# Patient Record
Sex: FEMALE | Race: WHITE | NOT HISPANIC OR LATINO | Employment: OTHER | URBAN - METROPOLITAN AREA
[De-identification: names, ages, dates, MRNs, and addresses within clinical notes are randomized per-mention and may not be internally consistent; named-entity substitution may affect disease eponyms.]

---

## 2018-04-13 ENCOUNTER — TRANSCRIBE ORDERS (OUTPATIENT)
Dept: ADMINISTRATIVE | Facility: HOSPITAL | Age: 58
End: 2018-04-13

## 2018-04-13 DIAGNOSIS — Z12.39 SCREENING BREAST EXAMINATION: Primary | ICD-10-CM

## 2018-04-14 ENCOUNTER — HOSPITAL ENCOUNTER (OUTPATIENT)
Dept: RADIOLOGY | Age: 58
Discharge: HOME | End: 2018-04-14
Payer: COMMERCIAL

## 2018-04-14 DIAGNOSIS — Z12.39 SCREENING BREAST EXAMINATION: ICD-10-CM

## 2018-04-14 PROCEDURE — 77067 SCR MAMMO BI INCL CAD: CPT

## 2018-04-14 PROCEDURE — 77063 BREAST TOMOSYNTHESIS BI: CPT

## 2018-12-18 ENCOUNTER — ANNUAL EXAM (OUTPATIENT)
Dept: OBGYN CLINIC | Facility: CLINIC | Age: 58
End: 2018-12-18
Payer: COMMERCIAL

## 2018-12-18 VITALS
HEIGHT: 63 IN | DIASTOLIC BLOOD PRESSURE: 80 MMHG | BODY MASS INDEX: 29.23 KG/M2 | SYSTOLIC BLOOD PRESSURE: 122 MMHG | WEIGHT: 165 LBS

## 2018-12-18 DIAGNOSIS — Z01.419 WOMEN'S ANNUAL ROUTINE GYNECOLOGICAL EXAMINATION: Primary | ICD-10-CM

## 2018-12-18 DIAGNOSIS — Z12.39 BREAST CANCER SCREENING: ICD-10-CM

## 2018-12-18 PROBLEM — M25.50 ARTHRALGIA OF MULTIPLE JOINTS: Status: ACTIVE | Noted: 2018-11-29

## 2018-12-18 PROBLEM — K21.9 GERD (GASTROESOPHAGEAL REFLUX DISEASE): Status: ACTIVE | Noted: 2018-12-18

## 2018-12-18 PROBLEM — N20.0 BILATERAL NEPHROLITHIASIS: Status: ACTIVE | Noted: 2018-11-29

## 2018-12-18 PROBLEM — R31.29 MICROSCOPIC HEMATURIA: Status: ACTIVE | Noted: 2018-11-29

## 2018-12-18 PROBLEM — E78.00 PURE HYPERCHOLESTEROLEMIA: Status: ACTIVE | Noted: 2018-12-18

## 2018-12-18 PROBLEM — F32.A DEPRESSION: Status: ACTIVE | Noted: 2018-12-18

## 2018-12-18 PROCEDURE — 99386 PREV VISIT NEW AGE 40-64: CPT | Performed by: NURSE PRACTITIONER

## 2018-12-18 RX ORDER — MULTIVITAMIN
1 TABLET ORAL DAILY
COMMUNITY
End: 2020-12-10 | Stop reason: ALTCHOICE

## 2018-12-18 RX ORDER — MONTELUKAST SODIUM 10 MG/1
10 TABLET ORAL
COMMUNITY
Start: 2018-09-11 | End: 2020-12-10 | Stop reason: ALTCHOICE

## 2018-12-18 RX ORDER — IBUPROFEN 200 MG
200 TABLET ORAL
COMMUNITY
Start: 2013-08-27

## 2018-12-18 RX ORDER — RANITIDINE 150 MG/1
1 TABLET ORAL
COMMUNITY
End: 2020-12-10 | Stop reason: ALTCHOICE

## 2018-12-18 RX ORDER — SODIUM PICOSULFATE, MAGNESIUM OXIDE, AND ANHYDROUS CITRIC ACID 10; 3.5; 12 MG/160ML; G/160ML; G/160ML
LIQUID ORAL
COMMUNITY
Start: 2018-12-10 | End: 2020-12-10 | Stop reason: ALTCHOICE

## 2018-12-18 RX ORDER — FEXOFENADINE HCL 180 MG/1
1 TABLET ORAL
COMMUNITY
Start: 2013-02-14

## 2018-12-18 RX ORDER — ALPRAZOLAM 0.25 MG/1
0.25 TABLET ORAL
COMMUNITY
Start: 2018-08-14

## 2018-12-18 RX ORDER — FLUTICASONE PROPIONATE 50 MCG
2 SPRAY, SUSPENSION (ML) NASAL DAILY
COMMUNITY
Start: 2011-05-08 | End: 2020-12-10 | Stop reason: ALTCHOICE

## 2018-12-18 NOTE — PROGRESS NOTES
Subjective  HPI:     Vinny Biggs is a 60 y o  female  She is a  2 Para 2, with 2 prior vaginal deliveries  She is menopausal  States her menopausal symptoms have improved and only has occasional hot flashes  She complains of dyspareunia and vaginal dryness  In the past she used premarin cream and did not like it's watery consistency  She had an episode of hematuria 6 weeks ago for about 3 days  CT and US are normal  She has renal scope scheduled on   Her depression/anxiety is stable  Her dental care is up-to-date  Gynecologic History    No LMP recorded  Patient is postmenopausal   Last Pap: 18  Results were: normal  Last mammogram: 18  Results were: normal  DXA Scan: 18: Osteopenia  Colonoscopy: scheduled for 18    Obstetric History    OB History    Para Term  AB Living   2         2   SAB TAB Ectopic Multiple Live Births           2      # Outcome Date GA Lbr Marques/2nd Weight Sex Delivery Anes PTL Lv   2             1                    The following portions of the patient's history were reviewed and updated as appropriate: allergies, current medications, past family history, past medical history, past social history, past surgical history and problem list     Review of Systems    Pertinent items are noted in HPI  Objective    Physical Exam   Constitutional: Vital signs are normal  She appears well-developed and well-nourished  Genitourinary: Vagina normal and uterus normal  Pelvic exam was performed with patient supine  There is no rash, tenderness, lesion or Bartholin's cyst on the right labia  There is no rash, tenderness, lesion or Bartholin's cyst on the left labia  Vagina exhibits no rugosity  Right adnexum does not display mass, does not display tenderness and does not display fullness  Left adnexum does not display mass, does not display tenderness and does not display fullness  Cervix is parous   Cervix does not exhibit motion tenderness, lesion, discharge, friability, polyp or nabothian cyst      Uterus is anteverted  HENT:   Head: Normocephalic and atraumatic  Neck: Neck supple  No thyromegaly present  Cardiovascular: Normal rate, regular rhythm, S1 normal, S2 normal and normal heart sounds  Pulmonary/Chest: Effort normal and breath sounds normal  Right breast exhibits no inverted nipple, no mass, no nipple discharge, no skin change and no tenderness  Left breast exhibits no inverted nipple, no mass, no nipple discharge, no skin change and no tenderness  Abdominal: Soft  Bowel sounds are normal  She exhibits no distension and no mass  There is no tenderness  There is no guarding  Lymphadenopathy:     She has no cervical adenopathy  She has no axillary adenopathy  Neurological: She is alert  Skin: Skin is warm  Psychiatric: She has a normal mood and affect  Nursing note and vitals reviewed  Assessment and Plan    Diagnoses and all orders for this visit:    Women's annual routine gynecological examination    Breast cancer screening  -     Mammo screening bilateral w cad; Future      Patient informed of a Stable GYN exam  A pap smear was not performed due to a negative HPV pap in Nov 2018  The current ASCCP guidelines were reviewed  The low risk patient will receive pap smear screening every 3 years until the age of 34 and then every 3 to 5 years with HPV co-testing from the ages of 33-67  I emphasized the importance of an annual pelvic and breast exam      A script for her yearly mammogram was provided  I discussed the option of Alyson Staggers for dyspareunia  Patient will think about it and get back to me  All questions have been answered to her satisfaction  Mammogram ordered  Follow up in: 1 year

## 2019-12-10 ENCOUNTER — ANNUAL EXAM (OUTPATIENT)
Dept: OBGYN CLINIC | Facility: CLINIC | Age: 59
End: 2019-12-10
Payer: COMMERCIAL

## 2019-12-10 VITALS
HEIGHT: 64 IN | SYSTOLIC BLOOD PRESSURE: 116 MMHG | BODY MASS INDEX: 27.83 KG/M2 | DIASTOLIC BLOOD PRESSURE: 76 MMHG | WEIGHT: 163 LBS

## 2019-12-10 DIAGNOSIS — L73.1 INGROWN HAIR: ICD-10-CM

## 2019-12-10 DIAGNOSIS — Z01.419 WOMEN'S ANNUAL ROUTINE GYNECOLOGICAL EXAMINATION: Primary | ICD-10-CM

## 2019-12-10 PROBLEM — M25.561 CHRONIC PAIN OF RIGHT KNEE: Status: ACTIVE | Noted: 2019-08-01

## 2019-12-10 PROBLEM — F32.9 REACTIVE DEPRESSION (SITUATIONAL): Status: ACTIVE | Noted: 2019-08-01

## 2019-12-10 PROBLEM — G89.29 CHRONIC RIGHT SHOULDER PAIN: Status: ACTIVE | Noted: 2019-08-01

## 2019-12-10 PROBLEM — N95.2 VAGINAL ATROPHY: Status: ACTIVE | Noted: 2019-09-12

## 2019-12-10 PROBLEM — M25.511 CHRONIC RIGHT SHOULDER PAIN: Status: ACTIVE | Noted: 2019-08-01

## 2019-12-10 PROBLEM — G89.29 CHRONIC PAIN OF RIGHT KNEE: Status: ACTIVE | Noted: 2019-08-01

## 2019-12-10 PROBLEM — M70.62 GREATER TROCHANTERIC BURSITIS OF BOTH HIPS: Status: ACTIVE | Noted: 2019-08-01

## 2019-12-10 PROBLEM — M70.61 GREATER TROCHANTERIC BURSITIS OF BOTH HIPS: Status: ACTIVE | Noted: 2019-08-01

## 2019-12-10 PROCEDURE — 99396 PREV VISIT EST AGE 40-64: CPT | Performed by: NURSE PRACTITIONER

## 2019-12-10 RX ORDER — FAMOTIDINE 20 MG/1
20 TABLET, FILM COATED ORAL 2 TIMES DAILY
COMMUNITY

## 2019-12-10 RX ORDER — DULOXETIN HYDROCHLORIDE 20 MG/1
20 CAPSULE, DELAYED RELEASE ORAL DAILY
COMMUNITY

## 2019-12-10 NOTE — PROGRESS NOTES
Subjective    HPI:     Kimi Yan is a 60 y o  female  She is a  2 Para 2, with 2 prior vaginal deliveries  She is menopausal  She has occasional hot flashes  She is experiencing vaginal dryness and some dyspareunia  She complains of issues with intimacy  She denies /GI and Gyn complaints  She complains of depression/anxiety  She was started on Cymbalta this summer which has helped  Medical, surgical and family history reviewed  Her dental care is up-to-date  She eats a healthy diet and exercises regularly  She is not happy with her weight  Gynecologic History    No LMP recorded  Patient is postmenopausal     Last Pap: 18  Results were: normal  Last mammogram: 18  Results were: normal  DXA scan: 18 - Osteopenia  Colonoscopy 18 - negative for malignancy  Repeat in 3 years  Obstetric History    OB History    Para Term  AB Living   2 2       2   SAB TAB Ectopic Multiple Live Births           2      # Outcome Date GA Lbr Marques/2nd Weight Sex Delivery Anes PTL Lv   2 Para            1 Para                The following portions of the patient's history were reviewed and updated as appropriate: allergies, current medications, past family history, past medical history, past social history, past surgical history and problem list     Review of Systems    Pertinent items are noted in HPI  Objective    Physical Exam   Constitutional: Vital signs are normal  She appears well-developed and well-nourished  Genitourinary: Vagina normal and uterus normal  Pelvic exam was performed with patient supine  There is no rash, tenderness, lesion or Bartholin's cyst on the right labia  There is no rash, tenderness, lesion or Bartholin's cyst on the left labia  Right adnexum does not display mass, does not display tenderness and does not display fullness  Left adnexum does not display mass, does not display tenderness and does not display fullness  Cervix is parous   Cervix does not exhibit motion tenderness, lesion, discharge, friability, polyp or nabothian cyst      Uterus is anteverted  HENT:   Head: Normocephalic and atraumatic  Neck: Neck supple  No thyromegaly present  Cardiovascular: Normal rate, regular rhythm, S1 normal, S2 normal and normal heart sounds  Pulmonary/Chest: Effort normal and breath sounds normal  Right breast exhibits no inverted nipple, no mass, no nipple discharge, no skin change and no tenderness  Left breast exhibits no inverted nipple, no mass, no nipple discharge, no skin change and no tenderness  Abdominal: Soft  Bowel sounds are normal  She exhibits no distension and no mass  There is no tenderness  There is no guarding  Lymphadenopathy:     She has no cervical adenopathy  She has no axillary adenopathy  Neurological: She is alert  Skin: Skin is warm  Psychiatric: She has a normal mood and affect  Nursing note and vitals reviewed  Assessment and Plan    Riley Weaver was seen today for gynecologic exam     Diagnoses and all orders for this visit:    Women's annual routine gynecological examination    Ingrown hair        Patient informed of a Stable GYN exam  A pap smear was not performed due to a negative HPV pap in 2018  I have discussed the importance of exercise and healthy diet as well as adequate intake of calcium and vitamin D  The current ASCCP guidelines were reviewed  The low risk patient will receive pap smear screening every 3 years until the age of 34 and then every 3 to 5 years with HPV co-testing from the ages of 33-67  I emphasized the importance of an annual pelvic and breast exam   She needs to make arrangements for her mammogram this year  Warm compresses to small lump  Do not squeeze  If site worsens return to office  All questions have been answered to her satisfaction  Mammogram ordered  Follow up in: 1 year

## 2020-02-05 ENCOUNTER — TELEPHONE (OUTPATIENT)
Dept: OBGYN CLINIC | Facility: CLINIC | Age: 60
End: 2020-02-05

## 2020-02-05 NOTE — TELEPHONE ENCOUNTER
Pt noticed red-brown spotting on underwear 2/1/2020 & last night with wiping, today noticed more red with wiping, but thinks she may have sore area near urethra  LMP age 49, then had post-menopausal spotting 11/2018 - had U/S 12/2018 with normal endometrial lining thickness  Scheduled appt for 2/12/2020 when KEDAR Holguin back in office  Pt to recall if increased bleeding/cramping

## 2020-02-06 NOTE — TELEPHONE ENCOUNTER
Patient called bleeding has gotten little more heavier  Also says that her  looked that she has what looks like a blood blister inside the inner upper part of her labia  Also vagina is swollen inside  [facility] it is also starting to get hard to urinate  Spoke with Dr Tru Medley  To go to ER and to schedule office visit     Patient was advised  Is also going to schedule appointment with PCP

## 2020-02-12 ENCOUNTER — OFFICE VISIT (OUTPATIENT)
Dept: OBGYN CLINIC | Facility: CLINIC | Age: 60
End: 2020-02-12
Payer: COMMERCIAL

## 2020-02-12 VITALS
WEIGHT: 162 LBS | BODY MASS INDEX: 27.66 KG/M2 | DIASTOLIC BLOOD PRESSURE: 78 MMHG | SYSTOLIC BLOOD PRESSURE: 118 MMHG | HEIGHT: 64 IN

## 2020-02-12 DIAGNOSIS — N36.2 URETHRAL POLYP: Primary | ICD-10-CM

## 2020-02-12 PROCEDURE — 99213 OFFICE O/P EST LOW 20 MIN: CPT | Performed by: NURSE PRACTITIONER

## 2020-02-12 PROCEDURE — 88305 TISSUE EXAM BY PATHOLOGIST: CPT | Performed by: PATHOLOGY

## 2020-02-12 NOTE — PROGRESS NOTES
Elveria Osgood 60year-old presents with complaint of bleeding which she believes is coming from the vagina, started 2 weeks ago, light and then it got a little more heavier  Stopped for a day and came back  She was checked by her "midwife friend" who said she had polyps  She denies bleeding with intercourse  In 2018 she had some bleeding and was seen by urology and GI  She had a cystoscopy and colonoscopy which were normal      She was last seen in our office in December 2019 for her annual and had a normal exam      ROS:  As indicated in HPI  All other ROS negative  Physical Exam   Constitutional: Vital signs are normal  She appears well-developed and well-nourished  Genitourinary: Vagina normal  Pelvic exam was performed with patient supine  Cervix does not exhibit motion tenderness, discharge, friability or polyp  HENT:   Head: Normocephalic and atraumatic  Neck: Neck supple  Neurological: She is alert  Skin: Skin is warm  Nursing note and vitals reviewed  Varinder Romero was seen today for postmenopausal bleeding and polyps  Diagnoses and all orders for this visit:    Urethral polyp  -     Ambulatory referral to Urology; Future  -     Tissue Exam; Future      Exam findings explained to the patient  The bleeding is coming from the polyps which were coming from her urethra and not her vagina  She was reassured that she is not having any vaginal bleeding  The cervix is normal with no evidence of bleeding, lesions or polyps  She was encouraged to schedule an appt with her urologist for further evaluation  We will call her with the results of the specimen sent to the lab  All questions and concerns addressed and patient verbalized understanding

## 2020-02-18 ENCOUNTER — TELEPHONE (OUTPATIENT)
Dept: OBGYN CLINIC | Facility: CLINIC | Age: 60
End: 2020-02-18

## 2020-02-18 NOTE — TELEPHONE ENCOUNTER
Patient informed of biopsy findings which represents fragments of a caruncle  No evidence atypia nor malignancy

## 2020-09-18 ENCOUNTER — TRANSCRIBE ORDERS (OUTPATIENT)
Dept: ADMINISTRATIVE | Facility: HOSPITAL | Age: 60
End: 2020-09-18

## 2020-09-18 DIAGNOSIS — Z12.31 ENCOUNTER FOR SCREENING MAMMOGRAM FOR MALIGNANT NEOPLASM OF BREAST: Primary | ICD-10-CM

## 2020-09-23 ENCOUNTER — HOSPITAL ENCOUNTER (OUTPATIENT)
Dept: MAMMOGRAPHY | Facility: HOSPITAL | Age: 60
Discharge: HOME | End: 2020-09-23
Payer: COMMERCIAL

## 2020-09-23 VITALS — WEIGHT: 162 LBS | BODY MASS INDEX: 27.66 KG/M2 | HEIGHT: 64 IN

## 2020-09-23 DIAGNOSIS — Z12.31 ENCOUNTER FOR SCREENING MAMMOGRAM FOR MALIGNANT NEOPLASM OF BREAST: ICD-10-CM

## 2020-09-23 PROCEDURE — 77067 SCR MAMMO BI INCL CAD: CPT

## 2020-09-23 PROCEDURE — 77063 BREAST TOMOSYNTHESIS BI: CPT

## 2020-12-10 ENCOUNTER — ANNUAL EXAM (OUTPATIENT)
Dept: OBGYN CLINIC | Facility: CLINIC | Age: 60
End: 2020-12-10
Payer: COMMERCIAL

## 2020-12-10 VITALS
HEIGHT: 64 IN | SYSTOLIC BLOOD PRESSURE: 122 MMHG | DIASTOLIC BLOOD PRESSURE: 78 MMHG | WEIGHT: 169 LBS | BODY MASS INDEX: 28.85 KG/M2

## 2020-12-10 DIAGNOSIS — Z12.31 ENCOUNTER FOR SCREENING MAMMOGRAM FOR BREAST CANCER: ICD-10-CM

## 2020-12-10 DIAGNOSIS — Z01.419 WOMEN'S ANNUAL ROUTINE GYNECOLOGICAL EXAMINATION: Primary | ICD-10-CM

## 2020-12-10 DIAGNOSIS — Z11.4 SCREENING FOR HIV (HUMAN IMMUNODEFICIENCY VIRUS): ICD-10-CM

## 2020-12-10 DIAGNOSIS — K64.4 EXTERNAL HEMORRHOIDS WITHOUT COMPLICATION: ICD-10-CM

## 2020-12-10 PROBLEM — S82.301A: Status: ACTIVE | Noted: 2020-10-28

## 2020-12-10 PROBLEM — Z98.890 STATUS POST SURGERY: Status: ACTIVE | Noted: 2020-02-28

## 2020-12-10 PROBLEM — S82.831A: Status: ACTIVE | Noted: 2020-10-28

## 2020-12-10 PROCEDURE — 99396 PREV VISIT EST AGE 40-64: CPT | Performed by: NURSE PRACTITIONER

## 2021-03-31 DIAGNOSIS — Z23 ENCOUNTER FOR IMMUNIZATION: ICD-10-CM

## 2021-12-07 ENCOUNTER — ANNUAL EXAM (OUTPATIENT)
Dept: OBGYN CLINIC | Facility: CLINIC | Age: 61
End: 2021-12-07
Payer: COMMERCIAL

## 2021-12-07 VITALS
HEIGHT: 64 IN | SYSTOLIC BLOOD PRESSURE: 120 MMHG | WEIGHT: 164.4 LBS | BODY MASS INDEX: 28.07 KG/M2 | DIASTOLIC BLOOD PRESSURE: 78 MMHG

## 2021-12-07 DIAGNOSIS — M85.80 OSTEOPENIA AFTER MENOPAUSE: ICD-10-CM

## 2021-12-07 DIAGNOSIS — Z01.419 WOMEN'S ANNUAL ROUTINE GYNECOLOGICAL EXAMINATION: Primary | ICD-10-CM

## 2021-12-07 DIAGNOSIS — Z12.31 ENCOUNTER FOR SCREENING MAMMOGRAM FOR BREAST CANCER: ICD-10-CM

## 2021-12-07 DIAGNOSIS — Z78.0 OSTEOPENIA AFTER MENOPAUSE: ICD-10-CM

## 2021-12-07 DIAGNOSIS — N94.10 DYSPAREUNIA, FEMALE: ICD-10-CM

## 2021-12-07 PROBLEM — S82.251A CLOSED DISPLACED COMMINUTED FRACTURE OF SHAFT OF RIGHT TIBIA: Status: ACTIVE | Noted: 2021-08-04

## 2021-12-07 PROBLEM — M75.112 INCOMPLETE ROTATOR CUFF TEAR OR RUPTURE OF LEFT SHOULDER, NOT SPECIFIED AS TRAUMATIC: Status: ACTIVE | Noted: 2021-11-02

## 2021-12-07 PROCEDURE — S0612 ANNUAL GYNECOLOGICAL EXAMINA: HCPCS | Performed by: NURSE PRACTITIONER

## 2021-12-07 PROCEDURE — G0476 HPV COMBO ASSAY CA SCREEN: HCPCS | Performed by: NURSE PRACTITIONER

## 2021-12-07 PROCEDURE — G0145 SCR C/V CYTO,THINLAYER,RESCR: HCPCS | Performed by: NURSE PRACTITIONER

## 2021-12-07 RX ORDER — ESTRADIOL 4 UG/1
INSERT VAGINAL
Qty: 18 EACH | Refills: 0 | Status: SHIPPED | OUTPATIENT
Start: 2021-12-07 | End: 2021-12-20

## 2021-12-07 RX ORDER — MONTELUKAST SODIUM 10 MG/1
TABLET ORAL
COMMUNITY
Start: 2021-09-14

## 2021-12-09 LAB
HPV HR 12 DNA CVX QL NAA+PROBE: NEGATIVE
HPV16 DNA CVX QL NAA+PROBE: NEGATIVE
HPV18 DNA CVX QL NAA+PROBE: NEGATIVE

## 2021-12-13 LAB
LAB AP GYN PRIMARY INTERPRETATION: NORMAL
Lab: NORMAL

## 2021-12-15 ENCOUNTER — PATIENT MESSAGE (OUTPATIENT)
Dept: OBGYN CLINIC | Facility: CLINIC | Age: 61
End: 2021-12-15

## 2021-12-20 DIAGNOSIS — N94.10 DYSPAREUNIA, FEMALE: Primary | ICD-10-CM

## 2021-12-20 RX ORDER — CONJUGATED ESTROGENS 0.62 MG/G
CREAM VAGINAL
Qty: 30 G | Refills: 1 | Status: SHIPPED | OUTPATIENT
Start: 2021-12-20 | End: 2022-02-28 | Stop reason: SDUPTHER

## 2022-02-28 ENCOUNTER — TELEPHONE (OUTPATIENT)
Dept: OBGYN CLINIC | Facility: CLINIC | Age: 62
End: 2022-02-28

## 2022-02-28 DIAGNOSIS — N94.10 DYSPAREUNIA, FEMALE: ICD-10-CM

## 2022-02-28 RX ORDER — CONJUGATED ESTROGENS 0.62 MG/G
CREAM VAGINAL
Qty: 30 G | Refills: 2 | Status: SHIPPED | OUTPATIENT
Start: 2022-02-28

## 2022-02-28 NOTE — TELEPHONE ENCOUNTER
called [facility]) - need new presc escribed with sig - please sign off on presc to [facility]

## 2023-05-09 PROBLEM — K22.2 SCHATZKI'S RING OF DISTAL ESOPHAGUS: Status: ACTIVE | Noted: 2023-05-09

## 2023-05-09 PROBLEM — K63.5 COLON POLYP: Status: ACTIVE | Noted: 2023-05-09

## 2023-11-22 ENCOUNTER — ANNUAL EXAM (OUTPATIENT)
Dept: OBGYN CLINIC | Facility: CLINIC | Age: 63
End: 2023-11-22
Payer: COMMERCIAL

## 2023-11-22 VITALS
BODY MASS INDEX: 29.16 KG/M2 | HEIGHT: 64 IN | WEIGHT: 170.8 LBS | SYSTOLIC BLOOD PRESSURE: 130 MMHG | DIASTOLIC BLOOD PRESSURE: 74 MMHG

## 2023-11-22 DIAGNOSIS — M85.80 OSTEOPENIA, UNSPECIFIED LOCATION: Primary | ICD-10-CM

## 2023-11-22 DIAGNOSIS — N95.2 ATROPHIC VAGINITIS: ICD-10-CM

## 2023-11-22 DIAGNOSIS — Z12.31 ENCOUNTER FOR SCREENING MAMMOGRAM FOR BREAST CANCER: ICD-10-CM

## 2023-11-22 DIAGNOSIS — Z01.419 WOMEN'S ANNUAL ROUTINE GYNECOLOGICAL EXAMINATION: ICD-10-CM

## 2023-11-22 PROCEDURE — S0612 ANNUAL GYNECOLOGICAL EXAMINA: HCPCS | Performed by: OBSTETRICS & GYNECOLOGY

## 2023-11-22 RX ORDER — POLYETHYLENE GLYCOL 3350, SODIUM SULFATE, SODIUM CHLORIDE, POTASSIUM CHLORIDE, ASCORBIC ACID, SODIUM ASCORBATE 140-9-5.2G
KIT ORAL
COMMUNITY
Start: 2023-10-10 | End: 2023-12-15

## 2023-11-22 RX ORDER — ESTRADIOL 0.1 MG/G
CREAM VAGINAL
Qty: 42.5 G | Refills: 2 | Status: SHIPPED | OUTPATIENT
Start: 2023-11-22

## 2023-11-22 RX ORDER — BUPROPION HYDROCHLORIDE 150 MG/1
1 TABLET ORAL EVERY MORNING
COMMUNITY
Start: 2023-11-08 | End: 2024-11-07

## 2023-11-22 NOTE — PATIENT INSTRUCTIONS
The patient was informed of a stable menopausal GYN examination. There are some problems with vaginal dryness with intercourse. She is requested restarting Estrace cream once a week. I agree with that plan. She should return to my office in 2 months for reevaluation. If she has any bleeding or spotting she will contact me as soon as possible.

## 2023-11-22 NOTE — PROGRESS NOTES
Assessment/Plan:    The patient was informed of a stable menopausal GYN examination. She has requested to restart the Estrace vaginal cream on a weekly basis. I have agreed about plan. She should return to my office in 2 months for reevaluation. This is for some atrophic changes of the vagina. She is content with her weight. She feels safe at home. She her mammograms colonoscopy is up-to-date. There is no new major family illnesses to report. Subjective:      Patient ID: Araceli Saunders is a 58 y.o. female. HPI    This is a 59-year-old white female, she is a  2 para 2 with 2 prior vaginal deliveries. She has been menopausal for many years. Still sexually active. Complaining about vaginal dryness. She has been on Estrace vaginal cream before. She is requesting restart that. I have agreed to that plan. She has a history of depression, she currently is on Wellbutrin. She also has a history of reflux. She is taking Pepcid. She has problems with GI stricture but this is under control. She sees a dentist on a regular basis. She is due for colonoscopy soon. We had a discussion about decreased libido. We talked about more time off and rescheduling. She has a history of osteopenia we will order a DEXA scan. She has slight stress urinary continence is not interfering with her lifestyle. She is a strong family history of pelvic floor relaxation. She feels safe at home. She would like to lose more weight. She will not get a Pap smear today. The following portions of the patient's history were reviewed and updated as appropriate: allergies, current medications, past family history, past medical history, past social history, past surgical history, and problem list.    Review of Systems   All other systems reviewed and are negative. Objective:      /74   Ht 5' 4" (1.626 m)   Wt 77.5 kg (170 lb 12.8 oz)   BMI 29.32 kg/m²          Physical Exam  Vitals reviewed.  Exam conducted with a chaperone present. Constitutional:       Appearance: Normal appearance. HENT:      Head: Normocephalic and atraumatic. Mouth/Throat:      Mouth: Mucous membranes are moist.   Eyes:      Extraocular Movements: Extraocular movements intact. Pupils: Pupils are equal, round, and reactive to light. Cardiovascular:      Rate and Rhythm: Normal rate and regular rhythm. Pulmonary:      Effort: Pulmonary effort is normal.      Breath sounds: Normal breath sounds. Abdominal:      General: Abdomen is flat. Bowel sounds are normal.      Palpations: Abdomen is soft. There is no hepatomegaly or splenomegaly. Hernia: No hernia is present. There is no hernia in the umbilical area, ventral area, left inguinal area or right inguinal area. Genitourinary:     General: Normal vulva. Pubic Area: No rash or pubic lice. Labia:         Right: No rash, tenderness, lesion or injury. Left: No rash, tenderness, lesion or injury. Urethra: No prolapse, urethral pain, urethral swelling or urethral lesion. Vagina: Normal. No signs of injury and foreign body. No vaginal discharge, erythema, tenderness, bleeding, lesions or prolapsed vaginal walls. Cervix: Normal.      Uterus: Normal.       Adnexa: Right adnexa normal and left adnexa normal.      Rectum: Normal.      Comments: The external genitalia within normal limits the vagina is clean the cervix parous but closed uterus is anterior normal size. There is no cervical motion tenderness. There is no evidence of prolapse. Junnie Rabia Urethra and bladder normal working relationship. Musculoskeletal:         General: Normal range of motion. Cervical back: Normal range of motion and neck supple. Skin:     General: Skin is warm and dry. Neurological:      General: No focal deficit present. Mental Status: She is alert and oriented to person, place, and time.    Psychiatric:         Mood and Affect: Mood normal. Behavior: Behavior normal.         Thought Content:  Thought content normal.

## 2024-01-04 ENCOUNTER — OFFICE VISIT (OUTPATIENT)
Dept: OBGYN CLINIC | Facility: CLINIC | Age: 64
End: 2024-01-04
Payer: COMMERCIAL

## 2024-01-04 VITALS
BODY MASS INDEX: 29.88 KG/M2 | SYSTOLIC BLOOD PRESSURE: 110 MMHG | HEIGHT: 64 IN | WEIGHT: 175 LBS | DIASTOLIC BLOOD PRESSURE: 68 MMHG

## 2024-01-04 DIAGNOSIS — N95.1 MENOPAUSE SYNDROME: Primary | ICD-10-CM

## 2024-01-04 PROCEDURE — 99213 OFFICE O/P EST LOW 20 MIN: CPT | Performed by: OBSTETRICS & GYNECOLOGY

## 2024-01-04 NOTE — PROGRESS NOTES
This is a 63-year-old white female, she is a  2 para 2 with 2 prior vaginal deliveries.  She rates open today to have discussion menopausal symptomatology.  Went to menopause at age 50.  Has now been 13 years.  She is no longer having penile penetration intercourse secondary to her 's issues.  He has erectile dysfunction.  She is here to discuss whether or not she should be off estrogen therapy.  Hot flashes and night sweats are under control.  Still some problem with sleeping.  She is more concerned about memory fog.  She wants to know if she should be on HRT to prevent Alzheimer's or memory loss.  She is also concerned about screening for Alzheimer's.  I asked her to check that out with her primary care physician.  I do not think she is a direct contact will consult with a neurologist at this time.    I informed her as well with hot flashes and night sweats are under control stairway for formal replacement therapy at this.  If she had any vaginal bleeding or discomfort that would be different.  New primary care physician to establish a baseline and did have some base.    I think her memory loss is secondary to aging and not menopause at present time.  She is very concerned about the possibility of Alzheimer's she saw her mother did do this.  She will keep me informed of her findings as far as a new primary care physician and her memory loss.

## 2024-01-04 NOTE — PATIENT INSTRUCTIONS
The patient I had a discussion about memory loss and menopause.  We talked about the process of memory loss and aging.  She is concerned about the approach.  Alzheimer's disease.  She is considering consultation with neurology.  I suggested she find a new primary care physician, Ms. young some basic neurological screening.  The only she had neurology if it was there a consultation or suggestion.  Patient return to my office on a as needed basis.  She is not on hormone replacement therapy at the present time.